# Patient Record
Sex: FEMALE | Race: WHITE | URBAN - METROPOLITAN AREA
[De-identification: names, ages, dates, MRNs, and addresses within clinical notes are randomized per-mention and may not be internally consistent; named-entity substitution may affect disease eponyms.]

---

## 2019-10-14 ENCOUNTER — HISTORICAL (OUTPATIENT)
Dept: PREADMISSION TESTING | Facility: HOSPITAL | Age: 24
End: 2019-10-14

## 2019-10-24 ENCOUNTER — HISTORICAL (OUTPATIENT)
Dept: SURGERY | Facility: HOSPITAL | Age: 24
End: 2019-10-24

## 2022-04-09 ENCOUNTER — HISTORICAL (OUTPATIENT)
Dept: ADMINISTRATIVE | Facility: HOSPITAL | Age: 27
End: 2022-04-09

## 2022-04-25 VITALS — HEIGHT: 66 IN | BODY MASS INDEX: 22.71 KG/M2 | WEIGHT: 141.31 LBS

## 2022-04-30 NOTE — OP NOTE
DATE OF SURGERY:        SURGEON:  Cahndler Garcia MD    PREOPERATIVE DIAGNOSES:    1. Conductive hearing loss.  2. 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    POSTOPERATIVE DIAGNOSES:    1. Conductive hearing loss.  2. 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    OPERATION PERFORMED:  Left middle ear exploration, left type 1 tympanoplasty with ossiculoplasty using the fascia graft and AmnioBand in the left ear and a right ear myringoplasty.    REASONS AND INDICATION FOR SURGERY:  Vicenta is status post a tympanic membrane perforation with approximately 20-decibel conductive hearing loss on the left side.  She had a severely retracted tympanic membrane on the left side as well as the right side.  The left side was addressed first.    DESCRIPTION OF PROCEDURE:  The tympanomeatal flap was injected with 0.5 cc of 2% xylocaine with epinephrine.  The tympanomeatal flap was elevated in the 6-12 position.  It was noted that the patient had a large amount of adhesions on the promontory of the middle ear cavity, and this was meticulously dissected off with the tympanomeatal flap.  Once entering into the middle ear space, it was noted that the malleus was dislocated to the posterior inferior quadrant, and the lenticular process of the incus was completely gone and eroded, and she had no ossicular connection.  An attempt was tried to use a prosthesis on the incus, and it did not fit.  The remnant was too sharp, and at that point, the malleus Nipper was used to remove the inferior structure of the malleus, and this was placed between the lower part of the incus and the superstructure of the stapes.  At that point, she had good ossicular continuity.  At that point, the ear was packed with Gelfoam soaked in antibiotic solution, and Floxin and a temporalis fascia graft that were harvested previously was placed under the temporalis, under the tympanomeatal flap, and this was placed back into its original  position.  This was supported with Gelfoam laterally.  The patient had an extremely large amount of adhesions in the mesotympanum.  The left ear was addressed, and the tympanomeatal flap was injected with 0.5 cc of 2% xylocaine with epinephrine.  All of the adhesions in the posterior superior quadrant of the tympanic membrane were released, and it was noted that she had a large amount of adhesions.  I did not want to do a formal tympanoplasty on the right ear because she had such significant changes in the left ear, and basically, we did an onlay myringoplasty within the residual temporalis fascia, and this went very well.  The area was supported with Gelfoam and AmnioBand in the middle ear cavity, as     well as the lateral surface.  She tolerated the procedure very well, and she was transferred back to the recovery room awake, alert, and responsive.        ______________________________  MD KATHY Mariscal/LEN  DD:  10/28/2019  Time:  11:19AM  DT:  10/28/2019  Time:  11:58AM  Job #:  [id number]